# Patient Record
(demographics unavailable — no encounter records)

---

## 2017-01-01 NOTE — NEWBORN INFANT-DISCHARGE
[place] Infant Discharge


Subjective/Events-Last Exam


Afebrile, no acute events. Breastfeeding okay.


Date Patient Was Seen:  2017


Time Patient Was Seen:  08:50





Condition/Feeding


[place] Feeding Method:  Breast Milk-Exclusive





Discharge Examination


Level of Alertness:  Alert


Cry Description:  Lusty


Activity/State:  Crying


Suckling:  Suckled w Encouragement


Skin:  No Bruising, Stork Bites


Head Circumference:  13.75


Fontanelles:  Soft


Anterior [place] Descriptio:  WNL


Cephalohematoma:  No


Sclera Description:  Clear (red reflex present 17)


Ears:  Normal


Mouth, Nose, Eyes:  Hard & Soft Palate Intact, Nares Patent Bilateral


Neck:  Head Mobile, Clavicles Intact


Chest Circumference:  13.75


Cardiovascular:  Regular Rhythm, Femoral Pulses Equal


Respiratory:  Regular, Unlabored


Breath Sounds:  Clear, Equal


Caput Succedaneum:  Yes


Abdomen:  Soft, Bowel Sounds Audible


Abdomen Circumference:  13.67


Genitalia:  Appear Normal


Back:  Spine Closed, Anus Patent


Hips:  WNL


Movement:  Symmetric-Body, Symmetric-Face


Muscle Tone:  Active


Extremities:  5 digits present on each extremity


Reflexes:  [place], Suck, Grasp-Bilateral





Weight/Height


Birth Weight:  3675


Height (Inches):  20.25


Height (Calculated Centimeters:  51.943502


Weight (Pounds):  7


Weight (Ounces):  11.1


Weight (Calculated Kilograms):  3.604683


Weight (Calculated Grams):  3489.826





Vital Signs/Labs/SS


Vital Signs





Vital Signs








  Date Time  Temp Pulse Resp B/P (MAP) Pulse Ox O2 Delivery O2 Flow Rate FiO2


 


17 16:40     99   


 


17 11:15 98.3 136 44     


 


17 19:50 97.9 130 52     


 


17 17:30 97.7 127 40  100   


 


17 17:15 97.7 138 44  100   


 


17 16:48 98.7 155 56  99   








Labs


Laboratory Tests


17 16:42:  Total Bilirubin 7.7H





Hearing Screening


Date of Hearing Screening:  2017


Results of Hearing Screening:  Pass





Discharge Diagnosis/Plan


Discharge Diagnosis/Impression:  Birth, Infant, Living, Term


Impression Note:


Term female infant with unremarkable nursery course, bilirubin at 24 hours high 

intermediate risk zone, repeat ordered for outpatient following day.


Plan


Follow up bilirubin tomorrow 6/3 and follow-up with primary physician on Monday.


Diagnosis/Problems:  





Copy


Copies To 1:   DEIRDRE NIÑO MD,REESE MAURER MD 2017 18:25